# Patient Record
Sex: MALE | Race: WHITE | Employment: FULL TIME | URBAN - METROPOLITAN AREA
[De-identification: names, ages, dates, MRNs, and addresses within clinical notes are randomized per-mention and may not be internally consistent; named-entity substitution may affect disease eponyms.]

---

## 2023-01-10 ENCOUNTER — OFFICE VISIT (OUTPATIENT)
Dept: OCCUPATIONAL MEDICINE | Age: 58
End: 2023-01-10

## 2023-01-10 DIAGNOSIS — Z00.00 WELLNESS EXAMINATION: Primary | ICD-10-CM

## 2023-01-10 RX ORDER — CYCLOBENZAPRINE HCL 10 MG
TABLET ORAL
COMMUNITY
Start: 2022-12-27

## 2023-01-10 RX ORDER — AMLODIPINE AND VALSARTAN 5; 160 MG/1; MG/1
TABLET ORAL
COMMUNITY
Start: 2017-02-22

## 2023-01-10 NOTE — PROGRESS NOTES
PROGRESS NOTE    SUBJECTIVE:   Jess Mcpherson is a 62 y.o. male seen for ____. Chief Complaint    Other         Other      Patient presents to clinic for quarterly wellness screening as required by employer to receive incentive. Patient voices no complaints or concerns at this time. Current Outpatient Medications   Medication Sig Dispense Refill    amLODIPine-valsartan (EXFORGE) 5-160 MG per tablet Exforge 5 mg-160 mg tablet   TAKE 1 TABLET BY MOUTH EVERY DAY      cyclobenzaprine (FLEXERIL) 10 MG tablet cyclobenzaprine 10 mg tablet   TAKE 1 TABLET BY MOUTH EVERY 8 HOURS AS NEEDED FOR MUSCLE SPASMS       No current facility-administered medications for this visit. Allergies   Allergen Reactions    Sulfa Antibiotics Other (See Comments)     Upsets stomach      Tamsulosin        Social History     Tobacco Use    Smoking status: Never    Smokeless tobacco: Never   Vaping Use    Vaping Use: Never used   Substance Use Topics    Alcohol use: Yes    Drug use: Never        Review of Systems   All other systems reviewed and are negative. OBJECTIVE:  There were no vitals taken for this visit. No results found for this visit on 01/10/23. Physical Exam  Pulmonary:      Effort: Pulmonary effort is normal.   Skin:     General: Skin is warm and dry. Neurological:      Mental Status: He is alert and oriented to person, place, and time. Psychiatric:         Attention and Perception: Attention normal.         Mood and Affect: Mood normal.         Speech: Speech normal.         Behavior: Behavior normal. Behavior is cooperative. Thought Content: Thought content normal.         Cognition and Memory: Cognition and memory normal.         Judgment: Judgment normal.       ASSESSMENT and PLAN    Hossein Mauricio was seen today for other. Diagnoses and all orders for this visit:    Wellness examination      Discussed/reviewed employee wellness program with patient.  Discussed all pertinent clinical examination findings/results with patient as applicable. Patient voiced no questions or concerns at present. Patient is encouraged to return to clinic as needed. Counseling provided on benefits of having a primary care provider which includes but is not limited to continuity of care and having a medical home when concerns arise. Also enforced that onsite clinic policy states that we are not to take the place of a primary care provider. Patientt verbalized understanding. I have reviewed the patient's medication list, past medical, family, social, and surgical history in detail and updated the patient record appropriately.     Prudence Dunn, APRN - CNP

## 2023-03-24 ENCOUNTER — OFFICE VISIT (OUTPATIENT)
Dept: OCCUPATIONAL MEDICINE | Age: 58
End: 2023-03-24

## 2023-03-24 VITALS — TEMPERATURE: 97.9 F | OXYGEN SATURATION: 99 % | RESPIRATION RATE: 18 BRPM | HEART RATE: 79 BPM

## 2023-03-24 DIAGNOSIS — B37.0 ORAL YEAST INFECTION: Primary | ICD-10-CM

## 2023-03-24 RX ORDER — FLUCONAZOLE 100 MG/1
TABLET ORAL
Qty: 11 TABLET | Refills: 0 | Status: SHIPPED | OUTPATIENT
Start: 2023-03-24

## 2023-03-24 ASSESSMENT — ENCOUNTER SYMPTOMS
WHEEZING: 0
CHEST TIGHTNESS: 0
SHORTNESS OF BREATH: 0
TROUBLE SWALLOWING: 0
SORE THROAT: 0

## 2023-03-24 NOTE — PROGRESS NOTES
PROGRESS NOTE    SUBJECTIVE:   Gertrude Abraham is a 62 y.o. male seen for ____. Patient presents to clinic requesting refill of Fluconazole prescription for oral complaints. He states last week he begin to experience oral itching, burning, tingling, discomfort that has been persistent. He states that this has been an ongoing issue for several years. He states he will intermittently have flare ups that occur. He has been evaluated several times by his PCP and was told that it was a recurrent yeast type infection that occurs and is something that he cant prevent, only treat when it happens. He has been provided a long standing prescription for Fluconazole to take in the event of a flare up but is currently out. He denies any recent illness, stress, or antibiotic use. He denies the presence of white patches, oral lesions. He does states that his symptoms seem to worsen with acidic foods. Current Outpatient Medications   Medication Sig Dispense Refill    fluconazole (DIFLUCAN) 100 MG tablet Take 2 tablets by mouth on day 1 then take 1 tablet by mouth for the next 9 days. 11 tablet 0    amLODIPine-valsartan (EXFORGE) 5-160 MG per tablet Exforge 5 mg-160 mg tablet   TAKE 1 TABLET BY MOUTH EVERY DAY      cyclobenzaprine (FLEXERIL) 10 MG tablet cyclobenzaprine 10 mg tablet   TAKE 1 TABLET BY MOUTH EVERY 8 HOURS AS NEEDED FOR MUSCLE SPASMS       No current facility-administered medications for this visit. Allergies   Allergen Reactions    Sulfa Antibiotics Other (See Comments)     Upsets stomach      Tamsulosin        Social History     Tobacco Use    Smoking status: Never    Smokeless tobacco: Never   Vaping Use    Vaping Use: Never used   Substance Use Topics    Alcohol use: Yes    Drug use: Never        Review of Systems   Constitutional:  Negative for chills and fever. HENT:  Negative for sore throat and trouble swallowing. Oral burning, itching, tingling, discomfort.

## 2023-03-27 ENCOUNTER — TELEPHONE (OUTPATIENT)
Dept: OCCUPATIONAL MEDICINE | Age: 58
End: 2023-03-27

## 2023-03-27 NOTE — TELEPHONE ENCOUNTER
Called to follow up with patient after recent visit in clinic. Patient states symptoms improved. He has no questions or concerns. Encouraged to utilize clinic as needed. He verbalized understanding.

## 2023-03-28 DIAGNOSIS — J30.2 SEASONAL ALLERGIES: Primary | ICD-10-CM

## 2023-03-28 RX ORDER — FLUTICASONE PROPIONATE 50 MCG
1 SPRAY, SUSPENSION (ML) NASAL DAILY
Qty: 32 G | Refills: 1 | Status: SHIPPED | OUTPATIENT
Start: 2023-03-28

## 2023-03-28 RX ORDER — CETIRIZINE HYDROCHLORIDE 10 MG/1
10 TABLET ORAL DAILY
Qty: 30 TABLET | Refills: 3 | Status: SHIPPED | OUTPATIENT
Start: 2023-03-28 | End: 2023-07-26

## 2023-03-28 NOTE — PROGRESS NOTES
Patient presents to clinic requesting prescription for Zyrtec and Flonase. He states he has been taking these medications OTC for his allergies with good control of symptoms. He states that with a prescription, his insurance covers the medications fully. Prescription for Zyrtec and Flonase sent to patients preferred pharmacy. Educated to take daily as prescribed. Risks, benefits, side effects discussed. Educated to avoid allergens and increase fluid intake. Patient has no complaints or concerns at present. He is encouraged to utilize the clinic as needed. Patient verbalized understanding and is agreeable with all discussed.

## 2023-05-05 ENCOUNTER — OFFICE VISIT (OUTPATIENT)
Dept: OCCUPATIONAL MEDICINE | Age: 58
End: 2023-05-05

## 2023-05-05 VITALS
DIASTOLIC BLOOD PRESSURE: 84 MMHG | SYSTOLIC BLOOD PRESSURE: 122 MMHG | HEART RATE: 84 BPM | OXYGEN SATURATION: 100 % | RESPIRATION RATE: 18 BRPM

## 2023-05-05 DIAGNOSIS — Z76.0 MEDICATION REFILL: Primary | ICD-10-CM

## 2023-05-05 DIAGNOSIS — R42 EPISODE OF DIZZINESS: ICD-10-CM

## 2023-05-05 RX ORDER — AMLODIPINE BESYLATE AND VALSARTAN 5; 160 MG/1; MG/1
TABLET, FILM COATED ORAL
Qty: 30 TABLET | Refills: 1 | Status: SHIPPED | OUTPATIENT
Start: 2023-05-05

## 2023-05-05 ASSESSMENT — ENCOUNTER SYMPTOMS
CHEST TIGHTNESS: 0
VOMITING: 0
NAUSEA: 0

## 2023-05-05 NOTE — PROGRESS NOTES
PROGRESS NOTE    SUBJECTIVE:   Saud Morejon is a 62 y.o. male seen for ____. Chief Complaint    Other         HPI    Patient presents to clinic for quarterly wellness screening as required by employer to receive incentive. Patient states that Tuesday night when lying in bed he rolled over and experienced a feeling of the room spinning. He states he later woke to go to the bathroom and when he got out of bed he had the same sensation again. He denies any associated headaches, vision changes, nausea/vomiting, auditory changes, ear pain/pressure, chest discomfort, difficulty breathing, syncope. He states the symptoms were self-limiting and have not occurred again since. Patient also requesting refill of blood pressure medication. He is stable on current therapy; labs from 3/2023 within normal limits. He has follow up appointment with PCP at end of this month. Current Outpatient Medications   Medication Sig Dispense Refill    EXFORGE 5-160 MG per tablet Exforge 5 mg-160 mg tablet  TAKE 1 TABLET BY MOUTH EVERY DAY 30 tablet 1    cetirizine (ZYRTEC) 10 MG tablet Take 1 tablet by mouth daily 30 tablet 3    fluticasone (FLONASE) 50 MCG/ACT nasal spray 1 spray by Each Nostril route daily 32 g 1    cyclobenzaprine (FLEXERIL) 10 MG tablet cyclobenzaprine 10 mg tablet   TAKE 1 TABLET BY MOUTH EVERY 8 HOURS AS NEEDED FOR MUSCLE SPASMS       No current facility-administered medications for this visit. Allergies   Allergen Reactions    Sulfa Antibiotics Other (See Comments)     Upsets stomach      Tamsulosin        Social History     Tobacco Use    Smoking status: Never    Smokeless tobacco: Never   Vaping Use    Vaping Use: Never used   Substance Use Topics    Alcohol use: Yes    Drug use: Never        Review of Systems   HENT:  Negative for ear discharge and ear pain. Eyes:  Negative for visual disturbance. Respiratory:  Negative for chest tightness.     Cardiovascular:  Negative for chest pain and

## 2023-09-12 ENCOUNTER — OFFICE VISIT (OUTPATIENT)
Dept: OCCUPATIONAL MEDICINE | Age: 58
End: 2023-09-12

## 2023-09-12 VITALS
SYSTOLIC BLOOD PRESSURE: 128 MMHG | HEART RATE: 71 BPM | OXYGEN SATURATION: 98 % | HEIGHT: 69 IN | DIASTOLIC BLOOD PRESSURE: 84 MMHG | BODY MASS INDEX: 20.47 KG/M2 | WEIGHT: 138.2 LBS

## 2023-09-12 DIAGNOSIS — Z00.00 WELLNESS EXAMINATION: Primary | ICD-10-CM

## 2023-09-12 ASSESSMENT — ENCOUNTER SYMPTOMS: BACK PAIN: 1

## 2023-11-07 ENCOUNTER — OFFICE VISIT (OUTPATIENT)
Dept: OCCUPATIONAL MEDICINE | Age: 58
End: 2023-11-07

## 2023-11-07 VITALS
WEIGHT: 138.89 LBS | HEART RATE: 69 BPM | BODY MASS INDEX: 20.57 KG/M2 | DIASTOLIC BLOOD PRESSURE: 86 MMHG | SYSTOLIC BLOOD PRESSURE: 138 MMHG | HEIGHT: 69 IN

## 2023-11-07 DIAGNOSIS — Z00.00 WELLNESS EXAMINATION: Primary | ICD-10-CM

## 2024-01-23 ENCOUNTER — OFFICE VISIT (OUTPATIENT)
Age: 59
End: 2024-01-23

## 2024-01-23 VITALS
OXYGEN SATURATION: 99 % | DIASTOLIC BLOOD PRESSURE: 92 MMHG | HEART RATE: 92 BPM | SYSTOLIC BLOOD PRESSURE: 140 MMHG | TEMPERATURE: 98.1 F | RESPIRATION RATE: 16 BRPM

## 2024-01-23 DIAGNOSIS — J02.9 SORE THROAT: ICD-10-CM

## 2024-01-23 DIAGNOSIS — H65.92 OME (OTITIS MEDIA WITH EFFUSION), LEFT: Primary | ICD-10-CM

## 2024-01-23 ASSESSMENT — ENCOUNTER SYMPTOMS
SHORTNESS OF BREATH: 0
ABDOMINAL PAIN: 0
DIARRHEA: 0
CHEST TIGHTNESS: 0
WHEEZING: 0
VOMITING: 0
RHINORRHEA: 1
COUGH: 0
SORE THROAT: 1

## 2024-01-23 NOTE — PROGRESS NOTES
PROGRESS NOTE    SUBJECTIVE:   Chriss Connelly is a 58 y.o. male seen for ____.    Chief Complaint    Otalgia; Pharyngitis         Pt presents to the clinic today with c/o of intermittent L ear pain that began Saturday followed by a headache and sore throat on Sunday. Pt has been taking OTC tylenol for headache with moderate relief.  No known sick contacts. Denies fever, cough, SOB, wheezing, and CP.    Otalgia   There is pain in the left ear. This is a new problem. The current episode started in the past 7 days. The problem occurs every few hours. The problem has been unchanged. There has been no fever. The pain is at a severity of 5/10. Associated symptoms include headaches, neck pain, rhinorrhea and a sore throat. Pertinent negatives include no abdominal pain, coughing, diarrhea, ear discharge, hearing loss, rash or vomiting. He has tried acetaminophen for the symptoms. The treatment provided mild relief. There is no history of a chronic ear infection, hearing loss or a tympanostomy tube.       Current Outpatient Medications   Medication Sig Dispense Refill   • EXFORGE 5-160 MG per tablet Exforge 5 mg-160 mg tablet  TAKE 1 TABLET BY MOUTH EVERY DAY 30 tablet 1   • fluticasone (FLONASE) 50 MCG/ACT nasal spray 1 spray by Each Nostril route daily 32 g 1   • cyclobenzaprine (FLEXERIL) 10 MG tablet cyclobenzaprine 10 mg tablet   TAKE 1 TABLET BY MOUTH EVERY 8 HOURS AS NEEDED FOR MUSCLE SPASMS       No current facility-administered medications for this visit.      Allergies   Allergen Reactions   • Sulfa Antibiotics Other (See Comments)     Upsets stomach     • Tamsulosin        Social History     Tobacco Use   • Smoking status: Never   • Smokeless tobacco: Never   Vaping Use   • Vaping Use: Never used   Substance Use Topics   • Alcohol use: Yes   • Drug use: Never        Review of Systems   Constitutional:  Negative for chills, fatigue and fever.   HENT:  Positive for ear pain, rhinorrhea and sore throat. Negative

## 2024-02-02 ENCOUNTER — OFFICE VISIT (OUTPATIENT)
Age: 59
End: 2024-02-02

## 2024-02-02 VITALS
WEIGHT: 145 LBS | TEMPERATURE: 98 F | RESPIRATION RATE: 15 BRPM | OXYGEN SATURATION: 100 % | BODY MASS INDEX: 20.76 KG/M2 | HEIGHT: 70 IN | DIASTOLIC BLOOD PRESSURE: 82 MMHG | SYSTOLIC BLOOD PRESSURE: 125 MMHG | HEART RATE: 83 BPM

## 2024-02-02 DIAGNOSIS — Z02.89 ENCOUNTER FOR EXAMINATION REQUIRED BY DEPARTMENT OF TRANSPORTATION (DOT): Primary | ICD-10-CM

## 2024-02-02 ASSESSMENT — VISUAL ACUITY: OU: 1

## 2024-02-02 NOTE — PROGRESS NOTES
Chriss Connelly (:  1965) is a 58 y.o. male,Established patient, here for evaluation of the following chief complaint(s):  Employment Physical (DOT physical )         ASSESSMENT/PLAN:   Diagnosis Orders   1. Encounter for examination required by [employer] ([employer])             No follow-ups on file.         Subjective   SUBJECTIVE/OBJECTIVE:  Other  Chronicity: n/a. Episode onset: n/a. Episode frequency: n/a. Progression since onset: n/a. Associated symptoms comments: Denies any symptoms . Exacerbated by: n/a. Treatments tried: n/a. Improvement on treatment: n/a.       Review of Systems   All other systems reviewed and are negative.         Objective   Physical Exam  Constitutional:       Appearance: Normal appearance. He is well-developed, well-groomed and normal weight.   HENT:      Right Ear: Hearing, tympanic membrane, ear canal and external ear normal. No middle ear effusion.      Left Ear: Hearing, tympanic membrane and ear canal normal.  No middle ear effusion.      Nose: Nose normal.      Mouth/Throat:      Lips: Pink.      Mouth: Mucous membranes are moist.      Pharynx: Oropharynx is clear. Uvula midline.   Eyes:      General: Vision grossly intact. Gaze aligned appropriately.      Extraocular Movements: Extraocular movements intact.      Conjunctiva/sclera: Conjunctivae normal.      Pupils: Pupils are equal, round, and reactive to light.      Visual Fields: Right eye visual fields normal and left eye visual fields normal.   Cardiovascular:      Rate and Rhythm: Normal rate and regular rhythm.      Pulses: Normal pulses.      Heart sounds: Normal heart sounds.   Pulmonary:      Effort: Pulmonary effort is normal.      Breath sounds: Normal breath sounds and air entry.   Abdominal:      General: Abdomen is flat. Bowel sounds are normal.      Palpations: Abdomen is soft.   Musculoskeletal:         General: Normal range of motion.      Right upper arm: Normal.      Left upper arm:

## 2024-03-22 ENCOUNTER — OFFICE VISIT (OUTPATIENT)
Age: 59
End: 2024-03-22

## 2024-03-22 VITALS
BODY MASS INDEX: 20.66 KG/M2 | SYSTOLIC BLOOD PRESSURE: 124 MMHG | HEART RATE: 80 BPM | RESPIRATION RATE: 16 BRPM | WEIGHT: 144 LBS | OXYGEN SATURATION: 97 % | DIASTOLIC BLOOD PRESSURE: 74 MMHG

## 2024-03-22 DIAGNOSIS — Z00.00 WELLNESS EXAMINATION: Primary | ICD-10-CM

## 2024-03-22 PROBLEM — E78.5 HYPERLIPIDEMIA: Status: ACTIVE | Noted: 2021-04-01

## 2024-03-22 PROBLEM — R30.0 DYSURIA: Status: RESOLVED | Noted: 2019-08-07 | Resolved: 2024-03-22

## 2024-03-22 PROBLEM — N40.0 BENIGN PROSTATIC HYPERPLASIA WITHOUT LOWER URINARY TRACT SYMPTOMS: Status: ACTIVE | Noted: 2017-02-22

## 2024-03-22 PROBLEM — R10.84 GENERALIZED ABDOMINAL PAIN: Status: RESOLVED | Noted: 2018-07-06 | Resolved: 2024-03-22

## 2024-03-22 PROBLEM — K64.9 HEMORRHOIDS: Status: RESOLVED | Noted: 2018-09-19 | Resolved: 2024-03-22

## 2024-03-22 PROBLEM — H52.4 PRESBYOPIA: Status: RESOLVED | Noted: 2018-09-06 | Resolved: 2024-03-22

## 2024-03-22 PROBLEM — M54.50 ACUTE LOW BACK PAIN: Status: RESOLVED | Noted: 2021-09-22 | Resolved: 2024-03-22

## 2024-03-22 PROBLEM — H52.00 HYPERMETROPIA: Status: RESOLVED | Noted: 2018-09-06 | Resolved: 2024-03-22

## 2024-03-22 RX ORDER — TRAMADOL HYDROCHLORIDE 50 MG/1
50 TABLET ORAL EVERY 6 HOURS PRN
COMMUNITY
Start: 2023-12-21

## 2024-03-22 RX ORDER — CETIRIZINE HYDROCHLORIDE 10 MG/1
10 TABLET ORAL DAILY
COMMUNITY

## 2024-03-22 NOTE — PROGRESS NOTES
PROGRESS NOTE    SUBJECTIVE   Chriss Connelly is a 58 y.o. male who presents to the onsite wellness clinic for his quarterly wellness screening as required by his  employer to receive an incentive. He voices no complaints or concerns at this time.      Chief Complaint    Quarterly Wellness Review         Chriss tries to eat a healthy diet and goes to [employer] 3-4 times weekly. He did physical therapy for his back last fall with good results. He denies any complaints at this time. He sees his Primary Care Provider, Jaye Renner PA-C, regularly at [facility] in [place]. He is up to date on colorectal cancer screening and says he gets a flu vaccine yearly.         Current Outpatient Medications   Medication Sig Dispense Refill    traMADol (ULTRAM) 50 MG tablet Take 1 tablet by mouth every 6 hours as needed. Max Daily Amount: 200 mg      EXFORGE 5-160 MG per tablet Exforge 5 mg-160 mg tablet  TAKE 1 TABLET BY MOUTH EVERY DAY 30 tablet 1    fluticasone (FLONASE) 50 MCG/ACT nasal spray 1 spray by Each Nostril route daily 32 g 1    cyclobenzaprine (FLEXERIL) 10 MG tablet cyclobenzaprine 10 mg tablet   TAKE 1 TABLET BY MOUTH EVERY 8 HOURS AS NEEDED FOR MUSCLE SPASMS       No current facility-administered medications for this visit.      Allergies   Allergen Reactions    Sulfa Antibiotics Other (See Comments)     Upsets stomach      Tamsulosin        Social History     Tobacco Use    Smoking status: Never    Smokeless tobacco: Never   Vaping Use    Vaping Use: Never used   Substance Use Topics    Alcohol use: Yes    Drug use: Never        Review of Systems  See HPI. No Complaints.     OBJECTIVE:  /74 (Site: Left Upper Arm, Position: Sitting, Cuff Size: Medium Adult)   Pulse 80   Resp 16   Wt 65.3 kg (144 lb)   SpO2 97%   BMI 20.66 kg/m²      No results found for this visit on 03/22/24.    Physical Exam  Vitals reviewed.   Constitutional:       General: He is awake. He is not in acute

## 2024-05-03 ENCOUNTER — OFFICE VISIT (OUTPATIENT)
Age: 59
End: 2024-05-03

## 2024-05-03 VITALS
DIASTOLIC BLOOD PRESSURE: 76 MMHG | BODY MASS INDEX: 21.25 KG/M2 | TEMPERATURE: 98.1 F | HEIGHT: 69 IN | HEART RATE: 71 BPM | RESPIRATION RATE: 15 BRPM | SYSTOLIC BLOOD PRESSURE: 124 MMHG | OXYGEN SATURATION: 100 % | WEIGHT: 143.5 LBS

## 2024-05-03 DIAGNOSIS — Z00.8 ENCOUNTER FOR BIOMETRIC SCREENING: Primary | ICD-10-CM

## 2024-05-03 NOTE — PROGRESS NOTES
Client reports feeling well today. He denies chest pain, shortness of breath, headaches, dizziness.   Client presents to the clinic for biometric screening lab work. Measurements and vitals taken. Venipuncture performed with no issues through [facility].   Client will return to clinic on Tuesday, 5/7 for a follow up visit to discuss lab results.

## 2024-05-04 LAB
ALBUMIN SERPL-MCNC: 5 G/DL (ref 3.8–4.9)
ALBUMIN/GLOB SERPL: 2.5 {RATIO} (ref 1.2–2.2)
ALP SERPL-CCNC: 101 IU/L (ref 44–121)
ALT SERPL-CCNC: 15 IU/L (ref 0–44)
AST SERPL-CCNC: 16 IU/L (ref 0–40)
BASOPHILS # BLD AUTO: 0 X10E3/UL (ref 0–0.2)
BASOPHILS NFR BLD AUTO: 0 %
BILIRUB SERPL-MCNC: 0.6 MG/DL (ref 0–1.2)
BUN SERPL-MCNC: 19 MG/DL (ref 6–24)
BUN/CREAT SERPL: 16 (ref 9–20)
CALCIUM SERPL-MCNC: 9.8 MG/DL (ref 8.7–10.2)
CHLORIDE SERPL-SCNC: 101 MMOL/L (ref 96–106)
CHOLEST SERPL-MCNC: 235 MG/DL (ref 100–199)
CHOLEST/HDLC SERPL: 3.4 RATIO (ref 0–5)
CO2 SERPL-SCNC: 23 MMOL/L (ref 20–29)
CREAT SERPL-MCNC: 1.2 MG/DL (ref 0.76–1.27)
EGFRCR SERPLBLD CKD-EPI 2021: 70 ML/MIN/1.73
EOSINOPHIL # BLD AUTO: 0.3 X10E3/UL (ref 0–0.4)
EOSINOPHIL NFR BLD AUTO: 4 %
ERYTHROCYTE [DISTWIDTH] IN BLOOD BY AUTOMATED COUNT: 13.4 % (ref 11.6–15.4)
GLOBULIN SER CALC-MCNC: 2 G/DL (ref 1.5–4.5)
GLUCOSE SERPL-MCNC: 78 MG/DL (ref 70–99)
HBA1C MFR BLD: 5.7 % (ref 4.8–5.6)
HCT VFR BLD AUTO: 44.9 % (ref 37.5–51)
HDLC SERPL-MCNC: 70 MG/DL
HGB BLD-MCNC: 15.3 G/DL (ref 13–17.7)
IMM GRANULOCYTES # BLD AUTO: 0 X10E3/UL (ref 0–0.1)
IMM GRANULOCYTES NFR BLD AUTO: 0 %
LDLC SERPL CALC-MCNC: 153 MG/DL (ref 0–99)
LYMPHOCYTES # BLD AUTO: 2.5 X10E3/UL (ref 0.7–3.1)
LYMPHOCYTES NFR BLD AUTO: 31 %
MCH RBC QN AUTO: 30.7 PG (ref 26.6–33)
MCHC RBC AUTO-ENTMCNC: 34.1 G/DL (ref 31.5–35.7)
MCV RBC AUTO: 90 FL (ref 79–97)
MONOCYTES # BLD AUTO: 0.6 X10E3/UL (ref 0.1–0.9)
MONOCYTES NFR BLD AUTO: 7 %
NEUTROPHILS # BLD AUTO: 4.6 X10E3/UL (ref 1.4–7)
NEUTROPHILS NFR BLD AUTO: 58 %
PLATELET # BLD AUTO: 303 X10E3/UL (ref 150–450)
POTASSIUM SERPL-SCNC: 4.5 MMOL/L (ref 3.5–5.2)
PROT SERPL-MCNC: 7 G/DL (ref 6–8.5)
PSA SERPL-MCNC: 0.8 NG/ML (ref 0–4)
RBC # BLD AUTO: 4.99 X10E6/UL (ref 4.14–5.8)
SODIUM SERPL-SCNC: 140 MMOL/L (ref 134–144)
TRIGL SERPL-MCNC: 70 MG/DL (ref 0–149)
TSH SERPL DL<=0.005 MIU/L-ACNC: 2.74 UIU/ML (ref 0.45–4.5)
VLDLC SERPL CALC-MCNC: 12 MG/DL (ref 5–40)
WBC # BLD AUTO: 8 X10E3/UL (ref 3.4–10.8)

## 2024-05-07 ENCOUNTER — OFFICE VISIT (OUTPATIENT)
Age: 59
End: 2024-05-07

## 2024-05-07 VITALS
DIASTOLIC BLOOD PRESSURE: 83 MMHG | SYSTOLIC BLOOD PRESSURE: 122 MMHG | BODY MASS INDEX: 21.19 KG/M2 | RESPIRATION RATE: 15 BRPM | WEIGHT: 143.5 LBS

## 2024-05-07 DIAGNOSIS — Z00.00 WELLNESS EXAMINATION: Primary | ICD-10-CM

## 2024-05-07 DIAGNOSIS — Z71.2 ENCOUNTER TO DISCUSS TEST RESULTS: ICD-10-CM

## 2024-05-07 NOTE — PROGRESS NOTES
PROGRESS NOTE    SUBJECTIVE:   Chriss Connelly is a 58 y.o. male seen for biometric screening and to discuss lab results.    Chief Complaint    Wellness Program         Client presents to the clinic for quarterly wellness visit and to discuss lab results from biometric screening. He reports feeling well today and denies any chest pain, SOB, dizziness, or headaches.       Current Outpatient Medications   Medication Sig Dispense Refill   • traMADol (ULTRAM) 50 MG tablet Take 1 tablet by mouth every 6 hours as needed. Max Daily Amount: 200 mg     • cetirizine (ZYRTEC) 10 MG tablet Take 1 tablet by mouth daily     • EXFORGE 5-160 MG per tablet Exforge 5 mg-160 mg tablet  TAKE 1 TABLET BY MOUTH EVERY DAY 30 tablet 1   • fluticasone (FLONASE) 50 MCG/ACT nasal spray 1 spray by Each Nostril route daily 32 g 1   • cyclobenzaprine (FLEXERIL) 10 MG tablet cyclobenzaprine 10 mg tablet   TAKE 1 TABLET BY MOUTH EVERY 8 HOURS AS NEEDED FOR MUSCLE SPASMS       No current facility-administered medications for this visit.      Allergies   Allergen Reactions   • Sulfa Antibiotics Other (See Comments)     Upsets stomach     • Tamsulosin        Social History     Tobacco Use   • Smoking status: Never   • Smokeless tobacco: Never   Vaping Use   • Vaping Use: Never used   Substance Use Topics   • Alcohol use: Yes   • Drug use: Never        Review of Systems   All other systems reviewed and are negative.         OBJECTIVE:  /83 (Site: Right Upper Arm, Position: Sitting)   Resp 15   Wt 65.1 kg (143 lb 8 oz)   BMI 21.19 kg/m²      No results found for this visit on 05/07/24.    Physical Exam  Cardiovascular:      Rate and Rhythm: Normal rate and regular rhythm.      Heart sounds: Normal heart sounds.   Pulmonary:      Effort: Pulmonary effort is normal.      Breath sounds: Normal breath sounds.   Psychiatric:         Mood and Affect: Mood normal.         Behavior: Behavior normal.         Thought Content: Thought content normal.

## 2024-07-12 ENCOUNTER — OFFICE VISIT (OUTPATIENT)
Age: 59
End: 2024-07-12

## 2024-07-12 VITALS
DIASTOLIC BLOOD PRESSURE: 98 MMHG | RESPIRATION RATE: 16 BRPM | HEART RATE: 84 BPM | TEMPERATURE: 98.4 F | OXYGEN SATURATION: 97 % | SYSTOLIC BLOOD PRESSURE: 159 MMHG

## 2024-07-12 DIAGNOSIS — K57.32 DIVERTICULITIS OF COLON: Primary | ICD-10-CM

## 2024-07-12 DIAGNOSIS — Z09 FOLLOW-UP EXAM: ICD-10-CM

## 2024-07-12 RX ORDER — AMOXICILLIN AND CLAVULANATE POTASSIUM 875; 125 MG/1; MG/1
1 TABLET, FILM COATED ORAL 2 TIMES DAILY
Qty: 14 TABLET | Refills: 0 | Status: SHIPPED | OUTPATIENT
Start: 2024-07-12 | End: 2024-07-19

## 2024-07-12 RX ORDER — FLUCONAZOLE 100 MG/1
100 TABLET ORAL DAILY
Qty: 8 TABLET | Refills: 0 | Status: SHIPPED | OUTPATIENT
Start: 2024-07-12 | End: 2024-07-20

## 2024-07-12 RX ORDER — ONDANSETRON 4 MG/1
4 TABLET, ORALLY DISINTEGRATING ORAL 3 TIMES DAILY PRN
COMMUNITY
Start: 2024-07-04

## 2024-07-12 RX ORDER — PRAVASTATIN SODIUM 40 MG
1 TABLET ORAL DAILY
COMMUNITY

## 2024-07-12 ASSESSMENT — ENCOUNTER SYMPTOMS
TROUBLE SWALLOWING: 0
SORE THROAT: 0
VOICE CHANGE: 0
BLOOD IN STOOL: 0
ABDOMINAL DISTENTION: 0
ODYNOPHAGIA: 0
RHINORRHEA: 0
RESPIRATORY NEGATIVE: 1
SINUS PRESSURE: 0
RECTAL PAIN: 0
ABDOMINAL PAIN: 1
BLOATING: 0
TENESMUS: 0
EYES NEGATIVE: 1
BACK PAIN: 0
JAUNDICE: 0
VOMITING: 0
NAUSEA: 0
ALLERGIC/IMMUNOLOGIC NEGATIVE: 1
HEMATEMESIS: 0
HEMATOCHEZIA: 0
SINUS PAIN: 0
ANAL BLEEDING: 0
CONSTIPATION: 0
FACIAL SWELLING: 0
DIARRHEA: 1

## 2024-07-12 NOTE — PROGRESS NOTES
for congestion, dental problem, drooling, ear discharge, ear pain, facial swelling, hearing loss, mouth sores, nosebleeds, postnasal drip, rhinorrhea, sinus pressure, sinus pain, sneezing, sore throat, tinnitus, trouble swallowing and voice change.         Tongue and roof of mouth feels tingly and dry and \"like I have thrush again from the flagyl\"    Eyes: Negative.    Respiratory: Negative.     Cardiovascular: Negative.    Gastrointestinal:  Positive for abdominal pain (left lower quadrant) and diarrhea. Negative for abdominal distention, anal bleeding, anorexia, bloating, blood in stool, constipation, dysphagia, hematemesis, hematochezia, jaundice, melena, nausea, rectal pain and vomiting.        Feels bloated.   LLQ has a dull pain and feels \"bruised\"    Endocrine: Negative.    Genitourinary: Negative.  Negative for dysuria.   Musculoskeletal: Negative.  Negative for arthralgias, back pain and myalgias.   Skin: Negative.  Negative for itching and rash.   Allergic/Immunologic: Negative.    Neurological: Negative.    Hematological: Negative.    Psychiatric/Behavioral: Negative.            OBJECTIVE:  BP (!) 159/98 (Site: Left Upper Arm, Position: Sitting)   Pulse 84   Temp 98.4 °F (36.9 °C) (Oral)   Resp 16   SpO2 97%      No results found for this visit on 07/12/24.    Physical Exam  Constitutional:       Appearance: Normal appearance. He is not ill-appearing or toxic-appearing.   HENT:      Head: Normocephalic.      Right Ear: Tympanic membrane, ear canal and external ear normal.      Left Ear: Tympanic membrane, ear canal and external ear normal.      Nose: Nose normal.      Mouth/Throat:      Tongue: Lesions present.      Palate: Lesions present.      Comments: Thick, white lesions present on tongue and oral mucosa.   Cardiovascular:      Rate and Rhythm: Normal rate and regular rhythm.      Heart sounds: Normal heart sounds.   Pulmonary:      Effort: Pulmonary effort is normal.      Breath sounds: Normal

## 2024-07-16 ENCOUNTER — TELEPHONE (OUTPATIENT)
Age: 59
End: 2024-07-16

## 2024-07-16 NOTE — TELEPHONE ENCOUNTER
Called to check on client from last Friday, 7/12/24. He states he is feeling \"back to himself\" and does not have any lingering abdominal pain.   If symptoms return, seek medical care.

## 2024-08-30 ENCOUNTER — OFFICE VISIT (OUTPATIENT)
Age: 59
End: 2024-08-30

## 2024-08-30 VITALS
HEART RATE: 84 BPM | OXYGEN SATURATION: 98 % | SYSTOLIC BLOOD PRESSURE: 142 MMHG | RESPIRATION RATE: 16 BRPM | TEMPERATURE: 98.1 F | DIASTOLIC BLOOD PRESSURE: 86 MMHG

## 2024-08-30 DIAGNOSIS — B37.0 OROPHARYNGEAL CANDIDIASIS: Primary | ICD-10-CM

## 2024-08-30 RX ORDER — FLUCONAZOLE 100 MG/1
100 TABLET ORAL DAILY
Qty: 7 TABLET | Refills: 0 | Status: SHIPPED | OUTPATIENT
Start: 2024-08-30 | End: 2024-09-06

## 2024-08-30 ASSESSMENT — ENCOUNTER SYMPTOMS
FACIAL SWELLING: 0
SHORTNESS OF BREATH: 0
ABDOMINAL PAIN: 0
SORE THROAT: 0
RHINORRHEA: 0
TROUBLE SWALLOWING: 0
STRIDOR: 0
SINUS PRESSURE: 0

## 2024-08-30 NOTE — PROGRESS NOTES
PROGRESS NOTE    SUBJECTIVE:   Chriss Connelly is a 58 y.o. male seen for ____.        Mouth Lesions   Episode onset: Patient was seen on July 12th for treatment of thrush, starting after taking antibiotics for diverticulitis. He reports completing 8 day treatment and symptoms resolved. Yesterday his symptoms returned and there is a tingling sensation in his mouth now, The onset was gradual. The problem has been gradually worsening. The problem is mild. Nothing relieves the symptoms. Pertinent negatives include no fever, no abdominal pain, no congestion, no ear pain, no headaches, no rhinorrhea, no sore throat, no stridor, no muscle aches, no neck stiffness and no URI. Associated symptoms comments: Patient reports a tingling sensation in his mouth which is how the thrush started last time in July.. He has been Eating and drinking normally.   Patient denies sores that wont heal in his mouth, denies new mouth wash or toothpaste  He does not chew or smoke  Patient reports starting a new probiotic and wasn't sure if that caused it    Current Outpatient Medications   Medication Sig Dispense Refill    fluconazole (DIFLUCAN) 100 MG tablet Take 1 tablet by mouth daily for 7 days 7 tablet 0    traMADol (ULTRAM) 50 MG tablet Take 1 tablet by mouth every 6 hours as needed.      cetirizine (ZYRTEC) 10 MG tablet Take 1 tablet by mouth daily      EXFORGE 5-160 MG per tablet Exforge 5 mg-160 mg tablet  TAKE 1 TABLET BY MOUTH EVERY DAY 30 tablet 1    cyclobenzaprine (FLEXERIL) 10 MG tablet cyclobenzaprine 10 mg tablet   TAKE 1 TABLET BY MOUTH EVERY 8 HOURS AS NEEDED FOR MUSCLE SPASMS      ondansetron (ZOFRAN-ODT) 4 MG disintegrating tablet Take 1 tablet by mouth 3 times daily as needed (Patient not taking: Reported on 8/30/2024)      pravastatin (PRAVACHOL) 40 MG tablet Take 1 tablet by mouth daily (Patient not taking: Reported on 8/30/2024)      fluticasone (FLONASE) 50 MCG/ACT nasal spray 1 spray by Each Nostril route daily    Effort: Pulmonary effort is normal. No respiratory distress.      Breath sounds: Normal breath sounds. No wheezing.   Skin:     General: Skin is warm and dry.   Neurological:      General: No focal deficit present.      Mental Status: He is alert and oriented to person, place, and time.      Motor: No weakness.      Coordination: Coordination normal.      Gait: Gait normal.   Psychiatric:         Mood and Affect: Mood normal.         Behavior: Behavior normal.         Thought Content: Thought content normal.         Judgment: Judgment normal.         ASSESSMENT and PLAN    Diagnoses and all orders for this visit:    Oropharyngeal candidiasis  -     fluconazole (DIFLUCAN) 100 MG tablet; Take 1 tablet by mouth daily for 7 days    Patient to follow up if symptoms not resolved.  Recommend exam by dentist if symptoms repeat.  Patient reports his last dental exam was a couple weeks ago and no concerns at the time    Counseled on benefits of having a primary care provider which includes, but is not limited to, continuity of care and having a medical home when concerns arise. Also enforced that onsite clinic policy states that we are not to take the place of a primary care provider, pt verbalized understanding.     SEs and risk vs benefits associated with medications prescribed discussed with patient who verbalized understanding. Pt verbalized understanding and agreement with plan of care. RTC for persisting/worsening symptoms or new complaints that arise. Discussed signs and symptoms that would warrant immediate evaluation including, but not limited to HA, blurred vision, speech disturbance, difficulty with ambulation/gait, numbness, tingling, weakness, syncope, chest pain, or shortness of breath.    I have reviewed the patient's medication list, past medical, family, social, and surgical history in detail and updated the patient record appropriately.    Ami Vásquez, APRN - CNP

## 2024-09-24 ENCOUNTER — OFFICE VISIT (OUTPATIENT)
Age: 59
End: 2024-09-24

## 2024-09-24 VITALS
DIASTOLIC BLOOD PRESSURE: 79 MMHG | WEIGHT: 143 LBS | OXYGEN SATURATION: 97 % | RESPIRATION RATE: 16 BRPM | SYSTOLIC BLOOD PRESSURE: 129 MMHG | BODY MASS INDEX: 21.12 KG/M2 | HEART RATE: 83 BPM

## 2024-09-24 DIAGNOSIS — Z00.00 WELLNESS EXAMINATION: Primary | ICD-10-CM

## 2024-09-24 ASSESSMENT — ENCOUNTER SYMPTOMS: RESPIRATORY NEGATIVE: 1

## 2024-11-26 ENCOUNTER — TELEPHONE (OUTPATIENT)
Age: 59
End: 2024-11-26

## 2024-11-26 DIAGNOSIS — B37.0 THRUSH, ORAL: Primary | ICD-10-CM

## 2024-11-26 RX ORDER — FLUCONAZOLE 100 MG/1
100 TABLET ORAL DAILY
Qty: 7 TABLET | Refills: 0 | Status: SHIPPED | OUTPATIENT
Start: 2024-11-26 | End: 2024-12-03

## 2024-11-26 NOTE — TELEPHONE ENCOUNTER
Client has been seen in clinic for thrush in mouth due to antibiotic use. He had a sinus infection last week for which he was taking antibiotics and has now developed thrush in his mouth. He has had this a few times before. He has tingling on tongue, white patches in his mouth, and his mouth feeling \"cottony\". Client would like to get a Rx called in for diflucan as this has worked in the past to clear up thrush. He is unable to get into the clinic in person at this time.   Rx sent for diflucan. Side effects discussed. Client should follow up in the clinic early next week or with PCP/urgent care sooner if needed.   If symptoms worsen or do not improve, seek medical attention sooner.

## 2024-12-03 ENCOUNTER — OFFICE VISIT (OUTPATIENT)
Age: 59
End: 2024-12-03

## 2024-12-03 VITALS
OXYGEN SATURATION: 100 % | HEART RATE: 91 BPM | RESPIRATION RATE: 16 BRPM | SYSTOLIC BLOOD PRESSURE: 141 MMHG | DIASTOLIC BLOOD PRESSURE: 91 MMHG

## 2024-12-03 DIAGNOSIS — B37.0 OROPHARYNGEAL CANDIDIASIS: Primary | ICD-10-CM

## 2024-12-03 RX ORDER — CLOTRIMAZOLE 10 MG/1
10 LOZENGE ORAL
Qty: 70 TABLET | Refills: 0 | Status: SHIPPED | OUTPATIENT
Start: 2024-12-03 | End: 2024-12-17

## 2024-12-03 ASSESSMENT — ENCOUNTER SYMPTOMS
VOICE CHANGE: 0
RESPIRATORY NEGATIVE: 1
RHINORRHEA: 0
SORE THROAT: 0
FACIAL SWELLING: 0
TROUBLE SWALLOWING: 0
SINUS PAIN: 0
SINUS PRESSURE: 0

## 2024-12-03 NOTE — PROGRESS NOTES
MCG/ACT nasal spray 1 spray by Each Nostril route daily (Patient not taking: Reported on 8/30/2024) 32 g 1    cyclobenzaprine (FLEXERIL) 10 MG tablet cyclobenzaprine 10 mg tablet   TAKE 1 TABLET BY MOUTH EVERY 8 HOURS AS NEEDED FOR MUSCLE SPASMS       No current facility-administered medications for this visit.      Allergies   Allergen Reactions    Sulfa Antibiotics Other (See Comments)     Upsets stomach      Tamsulosin        Social History     Tobacco Use    Smoking status: Never    Smokeless tobacco: Never   Vaping Use    Vaping status: Never Used   Substance Use Topics    Alcohol use: Yes    Drug use: Never        Review of Systems   Constitutional: Negative.  Negative for fever.   HENT:  Negative for congestion, dental problem, drooling, ear discharge, ear pain, facial swelling, hearing loss, mouth sores, nosebleeds, postnasal drip, rhinorrhea, sinus pressure, sinus pain, sneezing, sore throat, tinnitus, trouble swallowing and voice change.         +tingling on tongue and +white coating on tongue    Respiratory: Negative.     Cardiovascular: Negative.    Psychiatric/Behavioral: Negative.            OBJECTIVE:  There were no vitals taken for this visit.     No results found for this visit on 12/03/24.    Physical Exam  Constitutional:       Appearance: Normal appearance.   HENT:      Nose: Nose normal.      Mouth/Throat:      Lips: Pink.      Mouth: Oral lesions present.      Pharynx: Oropharynx is clear.      Tonsils: No tonsillar exudate.        Comments: Thick, white coating on tongue  +2 white patches on buccal oral mucosa   Cardiovascular:      Rate and Rhythm: Normal rate and regular rhythm.      Heart sounds: Normal heart sounds.   Pulmonary:      Breath sounds: Normal breath sounds.   Neurological:      Mental Status: He is alert.   Psychiatric:         Attention and Perception: Attention and perception normal.         Mood and Affect: Affect normal.         Speech: Speech normal.         Behavior:

## 2024-12-10 ENCOUNTER — OFFICE VISIT (OUTPATIENT)
Age: 59
End: 2024-12-10

## 2024-12-10 VITALS
DIASTOLIC BLOOD PRESSURE: 88 MMHG | HEART RATE: 88 BPM | RESPIRATION RATE: 16 BRPM | SYSTOLIC BLOOD PRESSURE: 122 MMHG | OXYGEN SATURATION: 100 %

## 2024-12-10 DIAGNOSIS — B37.0 OROPHARYNGEAL CANDIDIASIS: Primary | ICD-10-CM

## 2024-12-10 RX ORDER — FLUCONAZOLE 100 MG/1
100 TABLET ORAL DAILY
Qty: 7 TABLET | Refills: 0 | Status: SHIPPED | OUTPATIENT
Start: 2024-12-10 | End: 2024-12-17

## 2024-12-10 ASSESSMENT — ENCOUNTER SYMPTOMS
RHINORRHEA: 0
SINUS PRESSURE: 0
FACIAL SWELLING: 0
SINUS PAIN: 0
SORE THROAT: 0
VOICE CHANGE: 0
TROUBLE SWALLOWING: 0
RESPIRATORY NEGATIVE: 1

## 2024-12-10 NOTE — PROGRESS NOTES
PROGRESS NOTE    SUBJECTIVE:   Chriss Connelly is a 58 y.o. male seen for oral candidiasis follow up.        Client reports to the clinic for follow up of oral candidiasis. We have done 7 days of diflucan and 7 days of clotrimazole with improved symptoms but client states that his tongue still feels raw and is tingling. This has occurred a few other times this year.         History provided by:  Patient  History limited by: n/a.   used: No    Oral Pain   This is a recurrent problem. The current episode started 1 to 4 weeks ago. The problem occurs daily. The problem has been gradually improving. The pain is at a severity of 0/10. The patient is experiencing no pain. Pertinent negatives include no difficulty swallowing, facial pain, fever, oral bleeding, sinus pressure or thermal sensitivity. Associated symptoms comments: + white patches on tongue and in mouth  +tingling sensation on tongue  . Treatments tried: diflucan x 7 days and clotrimazole x 7 days. The treatment provided moderate relief.       Current Outpatient Medications   Medication Sig Dispense Refill    fluconazole (DIFLUCAN) 100 MG tablet Take 1 tablet by mouth daily for 7 days 7 tablet 0    clotrimazole (MYCELEX) 10 MG cristopher Take 1 tablet by mouth 5 times daily for 14 days 70 tablet 0    ondansetron (ZOFRAN-ODT) 4 MG disintegrating tablet Take 1 tablet by mouth 3 times daily as needed (Patient not taking: Reported on 8/30/2024)      pravastatin (PRAVACHOL) 40 MG tablet Take 1 tablet by mouth daily (Patient not taking: Reported on 8/30/2024)      traMADol (ULTRAM) 50 MG tablet Take 1 tablet by mouth every 6 hours as needed.      cetirizine (ZYRTEC) 10 MG tablet Take 1 tablet by mouth daily      EXFORGE 5-160 MG per tablet Exforge 5 mg-160 mg tablet  TAKE 1 TABLET BY MOUTH EVERY DAY 30 tablet 1    fluticasone (FLONASE) 50 MCG/ACT nasal spray 1 spray by Each Nostril route daily (Patient not taking: Reported on 8/30/2024) 32 g 1

## 2025-02-04 ENCOUNTER — OFFICE VISIT (OUTPATIENT)
Age: 60
End: 2025-02-04

## 2025-02-04 VITALS
DIASTOLIC BLOOD PRESSURE: 68 MMHG | TEMPERATURE: 98.3 F | HEART RATE: 61 BPM | SYSTOLIC BLOOD PRESSURE: 129 MMHG | OXYGEN SATURATION: 100 % | RESPIRATION RATE: 16 BRPM

## 2025-02-04 DIAGNOSIS — J10.1 INFLUENZA A: Primary | ICD-10-CM

## 2025-02-04 DIAGNOSIS — R05.1 ACUTE COUGH: ICD-10-CM

## 2025-02-04 LAB
INFLUENZA A ANTIGEN, POC: POSITIVE
INFLUENZA B ANTIGEN, POC: NEGATIVE
SARS-COV-2 RNA, POC: NEGATIVE

## 2025-02-04 RX ORDER — OSELTAMIVIR PHOSPHATE 75 MG/1
75 CAPSULE ORAL 2 TIMES DAILY
Qty: 10 CAPSULE | Refills: 0 | Status: SHIPPED | OUTPATIENT
Start: 2025-02-04 | End: 2025-02-09

## 2025-02-04 ASSESSMENT — ENCOUNTER SYMPTOMS
SINUS PAIN: 0
FACIAL SWELLING: 0
RHINORRHEA: 1
SHORTNESS OF BREATH: 0
HEARTBURN: 0
EYES NEGATIVE: 1
SORE THROAT: 1
ALLERGIC/IMMUNOLOGIC NEGATIVE: 1
CHOKING: 0
APNEA: 0
STRIDOR: 0
SINUS PRESSURE: 0
WHEEZING: 0
CHEST TIGHTNESS: 0
COUGH: 1
HEMOPTYSIS: 0

## 2025-02-04 NOTE — PROGRESS NOTES
PROGRESS NOTE    SUBJECTIVE:   Chriss Connelly is a 59 y.o. male seen for cough.    Chief Complaint    Cough           History provided by:  Patient  History limited by: n/a.   used: No    Cough  This is a new problem. The current episode started yesterday. The problem has been rapidly worsening. The problem occurs hourly. The cough is Productive of sputum. Associated symptoms include ear congestion, headaches, nasal congestion, postnasal drip, rhinorrhea and a sore throat. Pertinent negatives include no chest pain, chills, ear pain, fever, heartburn, hemoptysis, myalgias, rash, shortness of breath, sweats, weight loss or wheezing. Nothing aggravates the symptoms.     Current Outpatient Medications   Medication Sig Dispense Refill    oseltamivir (TAMIFLU) 75 MG capsule Take 1 capsule by mouth 2 times daily for 5 days 10 capsule 0    ondansetron (ZOFRAN-ODT) 4 MG disintegrating tablet Take 1 tablet by mouth 3 times daily as needed (Patient not taking: Reported on 8/30/2024)      pravastatin (PRAVACHOL) 40 MG tablet Take 1 tablet by mouth daily (Patient not taking: Reported on 8/30/2024)      traMADol (ULTRAM) 50 MG tablet Take 1 tablet by mouth every 6 hours as needed.      cetirizine (ZYRTEC) 10 MG tablet Take 1 tablet by mouth daily      EXFORGE 5-160 MG per tablet Exforge 5 mg-160 mg tablet  TAKE 1 TABLET BY MOUTH EVERY DAY 30 tablet 1    fluticasone (FLONASE) 50 MCG/ACT nasal spray 1 spray by Each Nostril route daily (Patient not taking: Reported on 8/30/2024) 32 g 1    cyclobenzaprine (FLEXERIL) 10 MG tablet cyclobenzaprine 10 mg tablet   TAKE 1 TABLET BY MOUTH EVERY 8 HOURS AS NEEDED FOR MUSCLE SPASMS       No current facility-administered medications for this visit.      Allergies   Allergen Reactions    Sulfa Antibiotics Other (See Comments)     Upsets stomach      Tamsulosin        Social History     Tobacco Use    Smoking status: Never    Smokeless tobacco: Never   Vaping Use

## 2025-02-07 ENCOUNTER — TELEPHONE (OUTPATIENT)
Age: 60
End: 2025-02-07

## 2025-02-07 NOTE — TELEPHONE ENCOUNTER
Called patient to follow up [facility]. Patient reports he took the Tamiflu as directed and denies side effects. He denies a fever and reports feeling good and back to work.   Denies questions or concerns    Follow up in the [facility] as needed

## 2025-03-25 ENCOUNTER — OFFICE VISIT (OUTPATIENT)
Age: 60
End: 2025-03-25

## 2025-03-25 VITALS
RESPIRATION RATE: 16 BRPM | DIASTOLIC BLOOD PRESSURE: 92 MMHG | HEART RATE: 79 BPM | BODY MASS INDEX: 20.44 KG/M2 | WEIGHT: 138 LBS | SYSTOLIC BLOOD PRESSURE: 153 MMHG | HEIGHT: 69 IN | OXYGEN SATURATION: 100 %

## 2025-03-25 DIAGNOSIS — Z00.00 WELLNESS EXAMINATION: ICD-10-CM

## 2025-03-25 DIAGNOSIS — Z00.8 ENCOUNTER FOR BIOMETRIC SCREENING: Primary | ICD-10-CM

## 2025-03-25 DIAGNOSIS — B37.0 OROPHARYNGEAL CANDIDIASIS: ICD-10-CM

## 2025-03-25 RX ORDER — FLUCONAZOLE 100 MG/1
100 TABLET ORAL DAILY
Qty: 7 TABLET | Refills: 0 | Status: SHIPPED | OUTPATIENT
Start: 2025-03-25 | End: 2025-04-01

## 2025-03-25 NOTE — PROGRESS NOTES
PROGRESS NOTE    SUBJECTIVE:   Chriss Connelly is a 59 y.o. male seen for yearly health screening and wellness examination.    Chief Complaint    Biometric Screening; Wellness Examination          Client presents to the clinic for quarterly wellness examination and reports feeling well today. He does report symptoms of thrush.  +cottony feeling in mouth  +altered taste  +mild discomfort  -fever  -swollen lymph nodes  -difficulty swallowing  -hoarseness          Current Outpatient Medications   Medication Sig Dispense Refill    fluconazole (DIFLUCAN) 100 MG tablet Take 1 tablet by mouth daily for 7 days 7 tablet 0    ondansetron (ZOFRAN-ODT) 4 MG disintegrating tablet Take 1 tablet by mouth 3 times daily as needed (Patient not taking: Reported on 8/30/2024)      pravastatin (PRAVACHOL) 40 MG tablet Take 1 tablet by mouth daily (Patient not taking: Reported on 8/30/2024)      traMADol (ULTRAM) 50 MG tablet Take 1 tablet by mouth every 6 hours as needed.      cetirizine (ZYRTEC) 10 MG tablet Take 1 tablet by mouth daily      EXFORGE 5-160 MG per tablet Exforge 5 mg-160 mg tablet  TAKE 1 TABLET BY MOUTH EVERY DAY 30 tablet 1    fluticasone (FLONASE) 50 MCG/ACT nasal spray 1 spray by Each Nostril route daily (Patient not taking: Reported on 8/30/2024) 32 g 1    cyclobenzaprine (FLEXERIL) 10 MG tablet cyclobenzaprine 10 mg tablet   TAKE 1 TABLET BY MOUTH EVERY 8 HOURS AS NEEDED FOR MUSCLE SPASMS       No current facility-administered medications for this visit.      Allergies   Allergen Reactions    Sulfa Antibiotics Other (See Comments)     Upsets stomach      Tamsulosin        Social History     Tobacco Use    Smoking status: Never    Smokeless tobacco: Never   Vaping Use    Vaping status: Never Used   Substance Use Topics    Alcohol use: Yes    Drug use: Never        Review of Systems   HENT:          Oral discomfort and white patches in mouth    All other systems reviewed and are negative.         OBJECTIVE:  BP

## 2025-03-26 ENCOUNTER — RESULTS FOLLOW-UP (OUTPATIENT)
Dept: PRIMARY CARE CLINIC | Facility: CLINIC | Age: 60
End: 2025-03-26

## 2025-03-26 LAB
ALBUMIN SERPL-MCNC: 5.3 G/DL (ref 3.8–4.9)
ALP SERPL-CCNC: 98 IU/L (ref 44–121)
ALT SERPL-CCNC: 13 IU/L (ref 0–44)
AST SERPL-CCNC: 18 IU/L (ref 0–40)
BASOPHILS # BLD AUTO: 0 X10E3/UL (ref 0–0.2)
BASOPHILS NFR BLD AUTO: 1 %
BILIRUB SERPL-MCNC: 0.7 MG/DL (ref 0–1.2)
BUN SERPL-MCNC: 12 MG/DL (ref 6–24)
BUN/CREAT SERPL: 11 (ref 9–20)
CALCIUM SERPL-MCNC: 10 MG/DL (ref 8.7–10.2)
CHLORIDE SERPL-SCNC: 102 MMOL/L (ref 96–106)
CHOLEST SERPL-MCNC: 262 MG/DL (ref 100–199)
CHOLEST/HDLC SERPL: 3.6 RATIO (ref 0–5)
CO2 SERPL-SCNC: 22 MMOL/L (ref 20–29)
CREAT SERPL-MCNC: 1.06 MG/DL (ref 0.76–1.27)
EGFRCR SERPLBLD CKD-EPI 2021: 81 ML/MIN/1.73
EOSINOPHIL # BLD AUTO: 0.1 X10E3/UL (ref 0–0.4)
EOSINOPHIL NFR BLD AUTO: 2 %
ERYTHROCYTE [DISTWIDTH] IN BLOOD BY AUTOMATED COUNT: 12.5 % (ref 11.6–15.4)
GLOBULIN SER CALC-MCNC: 2.3 G/DL (ref 1.5–4.5)
GLUCOSE SERPL-MCNC: 95 MG/DL (ref 70–99)
HBA1C MFR BLD: 5.6 % (ref 4.8–5.6)
HCT VFR BLD AUTO: 46.2 % (ref 37.5–51)
HDLC SERPL-MCNC: 73 MG/DL
HGB BLD-MCNC: 15.7 G/DL (ref 13–17.7)
IMM GRANULOCYTES # BLD AUTO: 0 X10E3/UL (ref 0–0.1)
IMM GRANULOCYTES NFR BLD AUTO: 0 %
LDLC SERPL CALC-MCNC: 175 MG/DL (ref 0–99)
LYMPHOCYTES # BLD AUTO: 1.9 X10E3/UL (ref 0.7–3.1)
LYMPHOCYTES NFR BLD AUTO: 34 %
MCH RBC QN AUTO: 31.4 PG (ref 26.6–33)
MCHC RBC AUTO-ENTMCNC: 34 G/DL (ref 31.5–35.7)
MCV RBC AUTO: 92 FL (ref 79–97)
MONOCYTES # BLD AUTO: 0.5 X10E3/UL (ref 0.1–0.9)
MONOCYTES NFR BLD AUTO: 9 %
NEUTROPHILS # BLD AUTO: 3 X10E3/UL (ref 1.4–7)
NEUTROPHILS NFR BLD AUTO: 54 %
PLATELET # BLD AUTO: 346 X10E3/UL (ref 150–450)
POTASSIUM SERPL-SCNC: 4.2 MMOL/L (ref 3.5–5.2)
PROT SERPL-MCNC: 7.6 G/DL (ref 6–8.5)
PSA SERPL-MCNC: 1.7 NG/ML (ref 0–4)
RBC # BLD AUTO: 5 X10E6/UL (ref 4.14–5.8)
SODIUM SERPL-SCNC: 142 MMOL/L (ref 134–144)
TRIGL SERPL-MCNC: 86 MG/DL (ref 0–149)
TSH SERPL DL<=0.005 MIU/L-ACNC: 2.61 UIU/ML (ref 0.45–4.5)
VLDLC SERPL CALC-MCNC: 14 MG/DL (ref 5–40)
WBC # BLD AUTO: 5.5 X10E3/UL (ref 3.4–10.8)

## 2025-04-01 ENCOUNTER — OFFICE VISIT (OUTPATIENT)
Age: 60
End: 2025-04-01

## 2025-04-01 DIAGNOSIS — Z71.2 ENCOUNTER TO DISCUSS TEST RESULTS: Primary | ICD-10-CM

## 2025-04-01 DIAGNOSIS — R97.20 INCREASING PROSTATE SPECIFIC ANTIGEN LEVEL: ICD-10-CM

## 2025-04-01 NOTE — PROGRESS NOTES
PROGRESS NOTE    SUBJECTIVE:   Chriss Connelly is a 59 y.o. male seen to discuss labs from biometric screening.    Chief Complaint    Discuss Labs         Client reports feeling well today and denies any chief complaints.           Current Outpatient Medications   Medication Sig Dispense Refill    fluconazole (DIFLUCAN) 100 MG tablet Take 1 tablet by mouth daily for 7 days 7 tablet 0    ondansetron (ZOFRAN-ODT) 4 MG disintegrating tablet Take 1 tablet by mouth 3 times daily as needed (Patient not taking: Reported on 8/30/2024)      pravastatin (PRAVACHOL) 40 MG tablet Take 1 tablet by mouth daily (Patient not taking: Reported on 8/30/2024)      traMADol (ULTRAM) 50 MG tablet Take 1 tablet by mouth every 6 hours as needed.      cetirizine (ZYRTEC) 10 MG tablet Take 1 tablet by mouth daily      EXFORGE 5-160 MG per tablet Exforge 5 mg-160 mg tablet  TAKE 1 TABLET BY MOUTH EVERY DAY 30 tablet 1    fluticasone (FLONASE) 50 MCG/ACT nasal spray 1 spray by Each Nostril route daily (Patient not taking: Reported on 8/30/2024) 32 g 1    cyclobenzaprine (FLEXERIL) 10 MG tablet cyclobenzaprine 10 mg tablet   TAKE 1 TABLET BY MOUTH EVERY 8 HOURS AS NEEDED FOR MUSCLE SPASMS       No current facility-administered medications for this visit.      Allergies   Allergen Reactions    Sulfa Antibiotics Other (See Comments)     Upsets stomach      Tamsulosin        Social History     Tobacco Use    Smoking status: Never    Smokeless tobacco: Never   Vaping Use    Vaping status: Never Used   Substance Use Topics    Alcohol use: Yes    Drug use: Never        Review of Systems   All other systems reviewed and are negative.         OBJECTIVE:  There were no vitals taken for this visit.     No results found for this visit on 04/01/25.    Physical Exam  Constitutional:       Appearance: He is normal weight.   Neurological:      Mental Status: He is alert and oriented to person, place, and time.   Psychiatric:         Mood and Affect: Mood